# Patient Record
Sex: FEMALE | Race: OTHER | HISPANIC OR LATINO | URBAN - METROPOLITAN AREA
[De-identification: names, ages, dates, MRNs, and addresses within clinical notes are randomized per-mention and may not be internally consistent; named-entity substitution may affect disease eponyms.]

---

## 2019-07-24 ENCOUNTER — OFFICE (OUTPATIENT)
Dept: URBAN - METROPOLITAN AREA CLINIC 78 | Facility: CLINIC | Age: 29
End: 2019-07-24

## 2019-07-24 VITALS
HEART RATE: 92 BPM | DIASTOLIC BLOOD PRESSURE: 91 MMHG | HEIGHT: 60 IN | WEIGHT: 113 LBS | TEMPERATURE: 98.5 F | SYSTOLIC BLOOD PRESSURE: 131 MMHG

## 2019-07-24 DIAGNOSIS — K92.1 MELENA: ICD-10-CM

## 2019-07-24 PROCEDURE — 99204 OFFICE O/P NEW MOD 45 MIN: CPT

## 2019-07-24 NOTE — SERVICEHPINOTES
30 yo female presents with recent rectal bleeding. She notes 2 episodes of bright red blood in stools within the past month. Stools may have been slightly looser at those times. She denies rectal or abdominal pain. No fevers, N/V/D, weight loss. She normally has 2 BMs per day but in the past two weeks states she has felt slightly more constipated (only one BM per day and slight sensation of needing to go but having harder time passing stool) but thinks it partly is due to her anxiety over seeing the blood. She denies NSAID use. She used to drink more ETOH over the weekend but has cut back some. No other concerns today and no known family h/o IBD or colon cancer.